# Patient Record
Sex: FEMALE | Race: WHITE | HISPANIC OR LATINO | Employment: UNEMPLOYED | URBAN - METROPOLITAN AREA
[De-identification: names, ages, dates, MRNs, and addresses within clinical notes are randomized per-mention and may not be internally consistent; named-entity substitution may affect disease eponyms.]

---

## 2018-04-17 ENCOUNTER — OFFICE VISIT (OUTPATIENT)
Dept: ENDOCRINOLOGY | Facility: HOSPITAL | Age: 53
End: 2018-04-17
Payer: COMMERCIAL

## 2018-04-17 VITALS
SYSTOLIC BLOOD PRESSURE: 118 MMHG | HEIGHT: 62 IN | DIASTOLIC BLOOD PRESSURE: 82 MMHG | HEART RATE: 96 BPM | BODY MASS INDEX: 25.8 KG/M2 | WEIGHT: 140.2 LBS

## 2018-04-17 DIAGNOSIS — E05.90 HYPERTHYROIDISM: Primary | ICD-10-CM

## 2018-04-17 PROCEDURE — 99204 OFFICE O/P NEW MOD 45 MIN: CPT | Performed by: INTERNAL MEDICINE

## 2018-04-17 RX ORDER — METHIMAZOLE 5 MG/1
TABLET ORAL
Qty: 180 TABLET | Refills: 5 | Status: SHIPPED | OUTPATIENT
Start: 2018-04-17 | End: 2018-06-06 | Stop reason: SDUPTHER

## 2018-04-17 RX ORDER — METHIMAZOLE 5 MG/1
5 TABLET ORAL 3 TIMES DAILY
COMMUNITY
End: 2018-04-17 | Stop reason: SDUPTHER

## 2018-04-17 NOTE — PROGRESS NOTES
4/17/2018    Assessment/Plan      Diagnoses and all orders for this visit:    Hyperthyroidism  -     methimazole (TAPAZOLE) 5 mg tablet; 4 tabs daily   -     TSH, 3rd generation Lab Collect; Future  -     T3, free; Future  -     T4, free Lab Collect; Future  -     Thyroid stimulating immunoglobulin Lab Collect; Future  -     Anti-microsomal antibody Lab Collect; Future    Other orders  -     Discontinue: methimazole (TAPAZOLE) 5 mg tablet; Take 5 mg by mouth 3 (three) times a day        Assessment/Plan:  Hyperthyroidism suspected due to Graves disease:  She is now biochemically and clinically hyperthyroid again  Will need to increase her methimazole further to 20 mg daily  Check TSH, free T3, free T4 in 6 weeks  Will also check a TSI level  We will be in touch with the patient as results are available  If the results continue to fluctuate or look abnormal, we may need to bring in the patient sooner to the office to see our  advanced practitioner to discuss further management  For now I have arranged a 6 month follow-up appointment  I am concerned that with her history of fluctuating levels of thyroid hormone and fluctuating doses that she may need definitive therapy considered such as thyroidectomy in the near future  Discussed with the patient about medical management of hyperthyroidism and use of antithyroid medications including the side effects of methimazole including joint aches, rash, agranulocytosis, and liver dysfunction  The patient should call if they develop side effects such as signs or symptoms of liver dysfunction (such as jaundice) or agranulocytosis (such as fever and sore throat)  Discussed the importance of compliance with medications to prevent life threatening complications of uncontrolled hyperthyroidism       CC:   Hyperthyroidism    History of Present Illness     HPI: Juancarlos Bonilla is a 47y o  year old female with history of hyperthyroidism suspected due to Graves disease diagnosed a few years ago when she was experiencing an unintentional weight loss, palpitations, tachycardia, tremors  She was started on antithyroid medications  Over the course of time, her levels have fluctuated  At one point her levels were quite hypothyroid requiring a dose decrease  Subsequently, her levels spiked again to becoming thyrotoxic  She had her dose increased to 15 mg daily of methimazole at her last appointment in January of 2018  She presents today and does note feelings of jitteriness and occasional palpitations and heat intolerance  She denies any biotin or other over-the-counter vitamins or supplements  No recent illnesses or iodine exposure  Denies known family history of thyroid disease  No neck compressive symptoms  Review of Systems   Constitutional: Positive for unexpected weight change (weight gain)  Negative for chills, fatigue and fever  HENT: Negative for trouble swallowing and voice change  Eyes: Negative for visual disturbance  Respiratory: Negative for shortness of breath  Cardiovascular: Positive for palpitations  Negative for chest pain and leg swelling  Gastrointestinal: Negative for abdominal pain, nausea and vomiting  Endocrine: Positive for heat intolerance  Negative for polydipsia and polyuria  Musculoskeletal: Negative for arthralgias and myalgias  Skin: Negative for rash  Neurological: Negative for dizziness, tremors and weakness  Hematological: Negative for adenopathy  Psychiatric/Behavioral: Negative for agitation and confusion  The patient is nervous/anxious (jittiriness)  Historical Information   No past medical history on file  No past surgical history on file    Social History   History   Alcohol use Not on file     History   Drug use: Unknown     History   Smoking Status    Former Smoker    Packs/day: 0 50    Years: 10 00    Types: Cigarettes   Smokeless Tobacco    Never Used     Family History:   Family History Problem Relation Age of Onset    No Known Problems Mother     Hypertension Father        Meds/Allergies   Current Outpatient Prescriptions   Medication Sig Dispense Refill    methimazole (TAPAZOLE) 5 mg tablet 4 tabs daily  180 tablet 5     No current facility-administered medications for this visit  Allergies   Allergen Reactions    Aspirin      bleeding       Objective   Vitals: Blood pressure 118/82, pulse 96, height 5' 2" (1 575 m), weight 63 6 kg (140 lb 3 2 oz)  Invasive Devices          No matching active lines, drains, or airways          Physical Exam   Constitutional: She is oriented to person, place, and time  She appears well-developed and well-nourished  No distress  HENT:   Head: Normocephalic and atraumatic  Mouth/Throat: No oropharyngeal exudate  Eyes: Conjunctivae and EOM are normal  Pupils are equal, round, and reactive to light  No scleral icterus  Neck: Normal range of motion  Neck supple  No thyromegaly present  Cardiovascular: Normal rate and regular rhythm  No murmur heard  Pulmonary/Chest: Effort normal and breath sounds normal  She has no wheezes  Abdominal: Soft  Bowel sounds are normal  She exhibits no distension  There is no tenderness  Musculoskeletal: Normal range of motion  She exhibits no edema  Neurological: She is alert and oriented to person, place, and time  She exhibits normal muscle tone  Skin: Skin is warm and dry  No rash noted  She is not diaphoretic  Psychiatric: She has a normal mood and affect  Her behavior is normal        The history was obtained from the review of the chart and from the patient  Lab Results:      Labs from [facility] on 04/12/2018:  T3 uptake 40 (22-35), total T4 13 7 (4 5-12), free thyroxine index 5 5 (1 4-3 8), TSH 0 01, free T3 8 0 (2 3-4 2), creatinine 0 66, glucose 79, BUN 21, , liver function within normal limits, calcium 9 7, albumin 4 4  No future appointments

## 2018-04-17 NOTE — LETTER
April 17, 2018     Solomon Meckel, MD  [address]   [address] [facility] [address]    Patient: Taniya Mendoza   YOB: 1965   Date of Visit: 4/17/2018       Dear Dr Jean-Claude Vo: Thank you for referring Tim Alonso to me for evaluation  Below are my notes for this consultation  If you have questions, please do not hesitate to call me  I look forward to following your patient along with you  Sincerely,        Ivone Johnson DO        CC: No Recipients  Ivone Johnson DO  4/17/2018  2:41 PM  Sign at close encounter  4/17/2018    Assessment/Plan      Diagnoses and all orders for this visit:    Hyperthyroidism  -     methimazole (TAPAZOLE) 5 mg tablet; 4 tabs daily   -     TSH, 3rd generation Lab Collect; Future  -     T3, free; Future  -     T4, free Lab Collect; Future  -     Thyroid stimulating immunoglobulin Lab Collect; Future  -     Anti-microsomal antibody Lab Collect; Future    Other orders  -     Discontinue: methimazole (TAPAZOLE) 5 mg tablet; Take 5 mg by mouth 3 (three) times a day        Assessment/Plan:  Hyperthyroidism suspected due to Graves disease:  She is now biochemically and clinically hyperthyroid again  Will need to increase her methimazole further to 20 mg daily  Check TSH, free T3, free T4 in 6 weeks  Will also check a TSI level  We will be in touch with the patient as results are available  If the results continue to fluctuate or look abnormal, we may need to bring in the patient sooner to the office to see our  advanced practitioner to discuss further management  For now I have arranged a 6 month follow-up appointment  I am concerned that with her history of fluctuating levels of thyroid hormone and fluctuating doses that she may need definitive therapy considered such as thyroidectomy in the near future      Discussed with the patient about medical management of hyperthyroidism and use of antithyroid medications including the side effects of methimazole including joint aches, rash, agranulocytosis, and liver dysfunction  The patient should call if they develop side effects such as signs or symptoms of liver dysfunction (such as jaundice) or agranulocytosis (such as fever and sore throat)  Discussed the importance of compliance with medications to prevent life threatening complications of uncontrolled hyperthyroidism  CC:   Hyperthyroidism    History of Present Illness     HPI: Vicky Sharp is a 47y o  year old female with history of hyperthyroidism suspected due to Graves disease diagnosed a few years ago when she was experiencing an unintentional weight loss, palpitations, tachycardia, tremors  She was started on antithyroid medications  Over the course of time, her levels have fluctuated  At one point her levels were quite hypothyroid requiring a dose decrease  Subsequently, her levels spiked again to becoming thyrotoxic  She had her dose increased to 15 mg daily of methimazole at her last appointment in January of 2018  She presents today and does note feelings of jitteriness and occasional palpitations and heat intolerance  She denies any biotin or other over-the-counter vitamins or supplements  No recent illnesses or iodine exposure  Denies known family history of thyroid disease  No neck compressive symptoms  Review of Systems   Constitutional: Positive for unexpected weight change (weight gain)  Negative for chills, fatigue and fever  HENT: Negative for trouble swallowing and voice change  Eyes: Negative for visual disturbance  Respiratory: Negative for shortness of breath  Cardiovascular: Positive for palpitations  Negative for chest pain and leg swelling  Gastrointestinal: Negative for abdominal pain, nausea and vomiting  Endocrine: Positive for heat intolerance  Negative for polydipsia and polyuria  Musculoskeletal: Negative for arthralgias and myalgias  Skin: Negative for rash     Neurological: Negative for dizziness, tremors and weakness  Hematological: Negative for adenopathy  Psychiatric/Behavioral: Negative for agitation and confusion  The patient is nervous/anxious (jittiriness)  Historical Information   No past medical history on file  No past surgical history on file  Social History   History   Alcohol use Not on file     History   Drug use: Unknown     History   Smoking Status    Former Smoker    Packs/day: 0 50    Years: 10 00    Types: Cigarettes   Smokeless Tobacco    Never Used     Family History:   Family History   Problem Relation Age of Onset    No Known Problems Mother     Hypertension Father        Meds/Allergies   Current Outpatient Prescriptions   Medication Sig Dispense Refill    methimazole (TAPAZOLE) 5 mg tablet 4 tabs daily  180 tablet 5     No current facility-administered medications for this visit  Allergies   Allergen Reactions    Aspirin      bleeding       Objective   Vitals: Blood pressure 118/82, pulse 96, height 5' 2" (1 575 m), weight 63 6 kg (140 lb 3 2 oz)  Invasive Devices          No matching active lines, drains, or airways          Physical Exam   Constitutional: She is oriented to person, place, and time  She appears well-developed and well-nourished  No distress  HENT:   Head: Normocephalic and atraumatic  Mouth/Throat: No oropharyngeal exudate  Eyes: Conjunctivae and EOM are normal  Pupils are equal, round, and reactive to light  No scleral icterus  Neck: Normal range of motion  Neck supple  No thyromegaly present  Cardiovascular: Normal rate and regular rhythm  No murmur heard  Pulmonary/Chest: Effort normal and breath sounds normal  She has no wheezes  Abdominal: Soft  Bowel sounds are normal  She exhibits no distension  There is no tenderness  Musculoskeletal: Normal range of motion  She exhibits no edema  Neurological: She is alert and oriented to person, place, and time  She exhibits normal muscle tone     Skin: Skin is warm and dry  No rash noted  She is not diaphoretic  Psychiatric: She has a normal mood and affect  Her behavior is normal        The history was obtained from the review of the chart and from the patient  Lab Results:      Labs from [facility] on 04/12/2018:  T3 uptake 40 (22-35), total T4 13 7 (4 5-12), free thyroxine index 5 5 (1 4-3 8), TSH 0 01, free T3 8 0 (2 3-4 2), creatinine 0 66, glucose 79, BUN 21, , liver function within normal limits, calcium 9 7, albumin 4 4  No future appointments

## 2018-06-05 LAB
T3FREE SERPL-MCNC: 4.9 PG/ML (ref 2.3–4.2)
T4 FREE SERPL-MCNC: 1.6 NG/DL (ref 0.8–1.8)
THYROPEROXIDASE AB SERPL-ACNC: 24 IU/ML
TSH SERPL-ACNC: 0.01 MIU/L
TSI SER-ACNC: 482 % BASELINE

## 2018-06-06 DIAGNOSIS — E05.90 HYPERTHYROIDISM: ICD-10-CM

## 2018-06-06 RX ORDER — METHIMAZOLE 5 MG/1
TABLET ORAL
Qty: 180 TABLET | Refills: 0 | Status: SHIPPED | OUTPATIENT
Start: 2018-06-06 | End: 2018-07-18 | Stop reason: SDUPTHER

## 2018-07-18 DIAGNOSIS — E05.90 HYPERTHYROIDISM: ICD-10-CM

## 2018-07-18 RX ORDER — METHIMAZOLE 5 MG/1
TABLET ORAL
Qty: 450 TABLET | Refills: 2 | Status: SHIPPED | OUTPATIENT
Start: 2018-07-18 | End: 2018-07-23 | Stop reason: SDUPTHER

## 2018-07-20 LAB
T3FREE SERPL-MCNC: 4.4 PG/ML (ref 2.3–4.2)
T4 FREE SERPL-MCNC: 1.4 NG/DL (ref 0.8–1.8)
TSH SERPL-ACNC: 0.01 MIU/L

## 2018-07-23 DIAGNOSIS — E05.90 HYPERTHYROIDISM: ICD-10-CM

## 2018-07-23 RX ORDER — METHIMAZOLE 5 MG/1
TABLET ORAL
Qty: 450 TABLET | Refills: 0
Start: 2018-07-23 | End: 2018-10-26 | Stop reason: SDUPTHER

## 2018-10-26 DIAGNOSIS — E05.90 HYPERTHYROIDISM: ICD-10-CM

## 2018-10-26 LAB
T3FREE SERPL-MCNC: 4.6 PG/ML (ref 2.3–4.2)
T4 FREE SERPL-MCNC: 1.6 NG/DL (ref 0.8–1.8)
TSH SERPL-ACNC: 0.01 MIU/L

## 2018-10-26 RX ORDER — METHIMAZOLE 5 MG/1
TABLET ORAL
Qty: 450 TABLET | Refills: 0
Start: 2018-10-26 | End: 2018-12-27 | Stop reason: SDUPTHER

## 2018-12-27 DIAGNOSIS — E05.90 HYPERTHYROIDISM: ICD-10-CM

## 2018-12-27 RX ORDER — METHIMAZOLE 5 MG/1
TABLET ORAL
Qty: 240 TABLET | Refills: 1 | Status: SHIPPED | OUTPATIENT
Start: 2018-12-27 | End: 2019-02-23 | Stop reason: SDUPTHER

## 2018-12-27 NOTE — TELEPHONE ENCOUNTER
Pt called for a refill of her Methimazole  She has appt with you on 1/16 and is going to get her labs done about a week before that

## 2019-02-21 ENCOUNTER — TELEPHONE (OUTPATIENT)
Dept: ENDOCRINOLOGY | Facility: HOSPITAL | Age: 54
End: 2019-02-21

## 2019-02-21 NOTE — TELEPHONE ENCOUNTER
Left message for call back  We got a request from the pharmacy to refill a 90 day supply of her Methimazole  Pt was due for labs in December and has had 2 f/u appts that were rescheduled  Pt must make appt and we can send enough till she is seen

## 2019-02-23 DIAGNOSIS — E05.90 HYPERTHYROIDISM: ICD-10-CM

## 2019-02-25 ENCOUNTER — TELEPHONE (OUTPATIENT)
Dept: ENDOCRINOLOGY | Facility: HOSPITAL | Age: 54
End: 2019-02-25

## 2019-02-25 NOTE — TELEPHONE ENCOUNTER
Patient is transferring to another practice  She requested records  Faxed release of info to [phone]   She will fax back

## 2019-02-26 RX ORDER — METHIMAZOLE 5 MG/1
TABLET ORAL
Qty: 240 TABLET | Refills: 1 | Status: SHIPPED | OUTPATIENT
Start: 2019-02-26 | End: 2021-11-02

## 2021-11-02 ENCOUNTER — OFFICE VISIT (OUTPATIENT)
Dept: OBGYN CLINIC | Facility: CLINIC | Age: 56
End: 2021-11-02

## 2021-11-02 VITALS
HEIGHT: 62 IN | BODY MASS INDEX: 26.68 KG/M2 | WEIGHT: 145 LBS | DIASTOLIC BLOOD PRESSURE: 88 MMHG | HEART RATE: 73 BPM | SYSTOLIC BLOOD PRESSURE: 154 MMHG

## 2021-11-02 DIAGNOSIS — Z12.31 ENCOUNTER FOR SCREENING MAMMOGRAM FOR MALIGNANT NEOPLASM OF BREAST: ICD-10-CM

## 2021-11-02 DIAGNOSIS — Z01.419 ENCOUNTER FOR GYNECOLOGICAL EXAMINATION WITHOUT ABNORMAL FINDING: Primary | ICD-10-CM

## 2021-11-02 DIAGNOSIS — Z12.4 SCREENING FOR CERVICAL CANCER: ICD-10-CM

## 2021-11-02 DIAGNOSIS — Z12.39 ENCOUNTER FOR BREAST CANCER SCREENING USING NON-MAMMOGRAM MODALITY: ICD-10-CM

## 2021-11-02 PROCEDURE — 99386 PREV VISIT NEW AGE 40-64: CPT | Performed by: NURSE PRACTITIONER

## 2021-11-02 RX ORDER — LEVOTHYROXINE SODIUM 112 UG/1
TABLET ORAL
COMMUNITY
Start: 2021-10-26

## 2021-11-19 ENCOUNTER — HOSPITAL ENCOUNTER (OUTPATIENT)
Dept: MAMMOGRAPHY | Facility: CLINIC | Age: 56
Discharge: HOME | End: 2021-11-19

## 2021-11-19 DIAGNOSIS — Z12.31 ENCOUNTER FOR SCREENING MAMMOGRAM FOR MALIGNANT NEOPLASM OF BREAST: ICD-10-CM

## 2021-11-19 PROCEDURE — 77067 SCR MAMMO BI INCL CAD: CPT

## 2021-11-19 PROCEDURE — 77063 BREAST TOMOSYNTHESIS BI: CPT

## 2021-11-23 ENCOUNTER — PATIENT MESSAGE (OUTPATIENT)
Dept: OBGYN CLINIC | Facility: CLINIC | Age: 56
End: 2021-11-23